# Patient Record
Sex: MALE | URBAN - METROPOLITAN AREA
[De-identification: names, ages, dates, MRNs, and addresses within clinical notes are randomized per-mention and may not be internally consistent; named-entity substitution may affect disease eponyms.]

---

## 2021-04-01 PROBLEM — Z00.00 ENCOUNTER FOR PREVENTIVE HEALTH EXAMINATION: Status: ACTIVE | Noted: 2021-04-01

## 2021-04-03 ENCOUNTER — EMERGENCY (EMERGENCY)
Facility: HOSPITAL | Age: 26
LOS: 1 days | Discharge: HOME | End: 2021-04-03
Attending: EMERGENCY MEDICINE
Payer: OTHER GOVERNMENT

## 2021-04-03 VITALS
OXYGEN SATURATION: 100 % | HEART RATE: 72 BPM | SYSTOLIC BLOOD PRESSURE: 118 MMHG | WEIGHT: 240.08 LBS | DIASTOLIC BLOOD PRESSURE: 65 MMHG | RESPIRATION RATE: 18 BRPM | HEIGHT: 78 IN

## 2021-04-03 PROCEDURE — 96374 THER/PROPH/DIAG INJ IV PUSH: CPT

## 2021-04-03 PROCEDURE — 99285 EMERGENCY DEPT VISIT HI MDM: CPT | Mod: 25

## 2021-04-03 RX ORDER — ONDANSETRON 8 MG/1
4 TABLET, FILM COATED ORAL ONCE
Refills: 0 | Status: COMPLETED | OUTPATIENT
Start: 2021-04-03 | End: 2021-04-03

## 2021-04-03 RX ORDER — SODIUM CHLORIDE 9 MG/ML
1000 INJECTION INTRAMUSCULAR; INTRAVENOUS; SUBCUTANEOUS ONCE
Refills: 0 | Status: COMPLETED | OUTPATIENT
Start: 2021-04-03 | End: 2021-04-03

## 2021-04-03 RX ADMIN — SODIUM CHLORIDE 1000 MILLILITER(S): 9 INJECTION INTRAMUSCULAR; INTRAVENOUS; SUBCUTANEOUS at 22:46

## 2021-04-03 RX ADMIN — ONDANSETRON 4 MILLIGRAM(S): 8 TABLET, FILM COATED ORAL at 21:43

## 2021-04-03 NOTE — ED PROVIDER NOTE - OBJECTIVE STATEMENT
26y M with no significant PMHx BIBEMS from [facility] from [facility] intoxicated and combative. Pt admits that he drank 12 to 16 beers tonight. As per [facility], pt vomited, then went to shower, but collapsed in shower and vomited again. EMS was called, but deferred transportation to [facility] due to aggression and being combative. Pt broke his restraints while in the ambulance and threatened [facility] staff. Uncooperative with exam or with obtaining vital signs due to pt being agitated and physically combative. Pt denies falls, head injury, or other known injuries. Denies taking any meds on a daily basis. Denies drinking frequently. 26y M with no significant PMHx BIBEMS from [facility] from [facility] intoxicated and combative. Pt admits that he drank 12 to 16 beers tonight. As per  at bedside, pt vomited, then went to shower, but collapsed in shower and vomited again. EMS was called, but deferred transportation to [facility] due to aggression and being combative. Pt broke his restraints while in the ambulance and threatened [facility] staff. Uncooperative with exam or with obtaining vital signs due to pt being agitated and physically combative. Pt denies falls, head injury, or other known injuries. Denies taking any meds on a daily basis. Denies drinking frequently.

## 2021-04-03 NOTE — ED PROVIDER NOTE - CONSTITUTIONAL, MLM
Awake, combative, uncooperative with examination or vitals. normal... Awake, intoxicated, combative, slurred speech.

## 2021-04-03 NOTE — ED PROVIDER NOTE - NSFOLLOWUPINSTRUCTIONS_ED_ALL_ED_FT
1. You were seen in the Emergency Room for alcohol intoxication  2. Please follow up with your doctor in 24-48 hours.  3. Return to the emergency room or seek immediate assistance for any new or concerning symptoms (such as fevers, chills, chest pain, shortness of breath, abdominal pain, nausea or vomiting ), or if you get worse.   4. Copies of your tests were provided to you for follow-up.  You must address all your findings with your doctor.

## 2021-04-03 NOTE — ED PROVIDER NOTE - CLINICAL SUMMARY MEDICAL DECISION MAKING FREE TEXT BOX
27 yo M BIBEMS from [facility] from [place], alcohol intoxication and combative. no evidence of trauma. will give zofran for nausea and IVF and place on 1:1 given combativeness in ER. Will monitor for sobriety and liekly d/c home

## 2021-04-03 NOTE — ED PROVIDER NOTE - PSYCHIATRIC, MLM
Awake, combative, uncooperative with examination or vitals. Awake, intoxicated, combative, slurred speech. MACHADO x4 spontaneously.

## 2021-04-03 NOTE — ED ADULT NURSE NOTE - OBJECTIVE STATEMENT
25 yo m arrived to the ed by ems with friend c/o intox; pt refusing to cooperate/refusing to give his name and any information r/t his hx; pt placed on a 1:1 r/t attempting to leave the ER; safety maintained, 1:1 @ bedside, pt currently sleeping and cooperative; zofran given emergently r/t vomiting; md @ bedside; vitals stable

## 2021-04-03 NOTE — ED PROVIDER NOTE - PROGRESS NOTE DETAILS
Attending Faiza Castaneda: pt currently sleeping. no evidence of trauma. will re need sober re-eval Filomena Lino MD PGY-3  patient still sleeping but arousable to verbal stimuli. states he drank 12 pack of beer today, not a daily drinker. once patient is more arousable, will PO challenge Filomena Lino MD PGY-3 patient now awake, ambulating with steady gait, tolerating PO. [facility] here to pick patient up and bring back. will d/c with return precautions

## 2021-04-03 NOTE — ED PROVIDER NOTE - PATIENT PORTAL LINK FT
You can access the FollowMyHealth Patient Portal offered by [facility] [place] by registering at the following website: [email or web address]. By joining [employer]’s FollowMyHealth portal, you will also be able to view your health information using other applications (apps) compatible with our system.

## 2021-04-03 NOTE — ED PROVIDER NOTE - EYES, MLM
Clear bilaterally, pupils equal, round and reactive to light. EOMI, PERRL, no conjunctival pallor, no scleral icterus.

## 2021-04-03 NOTE — ED PROVIDER NOTE - UNABLE TO OBTAIN
Pt is intoxicated and being agitated, aggressive, and physically combative. Urgent need for Intervention

## 2021-04-03 NOTE — ED PROVIDER NOTE - ATTENDING CONTRIBUTION TO CARE
Attending MD Marrero:  I personally have seen and examined this patient.  Resident note reviewed and agree on plan of care and except where noted.  See HPI, PE, and MDM for details.      Pt presenting with alcohol intoxication, no other ingestions. No trauma. Airway patent. Patient transferred to [facility] and care transitioned to Dr. Castaneda

## 2021-04-03 NOTE — ED PROVIDER NOTE - ENMT, MLM
Airway patent, NCAT. Airway patent, NCAT. No visible ecchymosis, no abrasions, no lacerations/tears. MMM, no discharge.

## 2021-04-03 NOTE — ED ADULT NURSE NOTE - NSIMPLEMENTINTERV_GEN_ALL_ED
Implemented All Fall Risk Interventions:  [place] to call system. Call bell, personal items and telephone within reach. Instruct patient to call for assistance. Room bathroom lighting operational. Non-slip footwear when patient is off stretcher. Physically safe environment: no spills, clutter or unnecessary equipment. Stretcher in lowest position, wheels locked, appropriate side rails in place. Provide visual cue, wrist band, yellow gown, etc. Monitor gait and stability. Monitor for mental status changes and reorient to person, place, and time. Review medications for side effects contributing to fall risk. Reinforce activity limits and safety measures with patient and family.

## 2021-04-04 VITALS
HEART RATE: 71 BPM | OXYGEN SATURATION: 97 % | DIASTOLIC BLOOD PRESSURE: 68 MMHG | SYSTOLIC BLOOD PRESSURE: 126 MMHG | TEMPERATURE: 98 F | RESPIRATION RATE: 20 BRPM

## 2021-04-06 ENCOUNTER — NON-APPOINTMENT (OUTPATIENT)
Age: 26
End: 2021-04-06

## 2021-04-06 ENCOUNTER — APPOINTMENT (OUTPATIENT)
Dept: CARDIOLOGY | Facility: CLINIC | Age: 26
End: 2021-04-06
Payer: OTHER GOVERNMENT

## 2021-04-06 VITALS
DIASTOLIC BLOOD PRESSURE: 76 MMHG | TEMPERATURE: 98.6 F | BODY MASS INDEX: 30.55 KG/M2 | WEIGHT: 264 LBS | SYSTOLIC BLOOD PRESSURE: 128 MMHG | HEART RATE: 67 BPM | OXYGEN SATURATION: 98 % | HEIGHT: 78 IN

## 2021-04-06 DIAGNOSIS — F17.200 NICOTINE DEPENDENCE, UNSPECIFIED, UNCOMPLICATED: ICD-10-CM

## 2021-04-06 DIAGNOSIS — Z78.9 OTHER SPECIFIED HEALTH STATUS: ICD-10-CM

## 2021-04-06 DIAGNOSIS — U07.1 COVID-19: ICD-10-CM

## 2021-04-06 NOTE — REVIEW OF SYSTEMS
[Headache] : no headache [Recent Weight Gain (___ Lbs)] : no recent weight gain [Feeling Fatigued] : not feeling fatigued [Recent Weight Loss (___ Lbs)] : no recent weight loss [Shortness Of Breath] : no shortness of breath [Dyspnea on exertion] : not dyspnea during exertion [Chest  Pressure] : no chest pressure [Chest Pain] : no chest pain [Lower Ext Edema] : no extremity edema [Leg Claudication] : no intermittent leg claudication [Palpitations] : no palpitations [see HPI] : see HPI [Cough] : no cough [Wheezing] : no wheezing [Coughing Up Blood] : no hemoptysis [Dizziness] : no dizziness [Negative] : Heme/Lymph

## 2021-04-06 NOTE — HISTORY OF PRESENT ILLNESS
[FreeTextEntry1] : April 6, 2021.  Patient is healthy active exercises without limitation.  Never told of a heart murmur or any cardiac issues.  No real past medical history of any significance.  No family history of coronary artery disease, cardiomyopathy, murmurs arrhythmias syncope.  Around March 2 patient had sore throat and a cough that persisted for breath 3 days.  He tested positive for COVID-19.  He has been asymptomatic since then including exercise sports etc.

## 2021-04-06 NOTE — PHYSICAL EXAM
[General Appearance - Well Developed] : well developed [Normal Appearance] : normal appearance [Well Groomed] : well groomed [General Appearance - Well Nourished] : well nourished [No Deformities] : no deformities [General Appearance - In No Acute Distress] : no acute distress [Normal Conjunctiva] : the conjunctiva exhibited no abnormalities [Eyelids - No Xanthelasma] : the eyelids demonstrated no xanthelasmas [Normal Oral Mucosa] : normal oral mucosa [No Oral Pallor] : no oral pallor [No Oral Cyanosis] : no oral cyanosis [Normal Jugular Venous A Waves Present] : normal jugular venous A waves present [Normal Jugular Venous V Waves Present] : normal jugular venous V waves present [No Jugular Venous Medley A Waves] : no jugular venous medley A waves [Heart Rate And Rhythm] : heart rate and rhythm were normal [Heart Sounds] : normal S1 and S2 [Murmurs] : no murmurs present [Respiration, Rhythm And Depth] : normal respiratory rhythm and effort [Exaggerated Use Of Accessory Muscles For Inspiration] : no accessory muscle use [Auscultation Breath Sounds / Voice Sounds] : lungs were clear to auscultation bilaterally [Abdomen Soft] : soft [Abdomen Tenderness] : non-tender [Abdomen Mass (___ Cm)] : no abdominal mass palpated [Abnormal Walk] : normal gait [Gait - Sufficient For Exercise Testing] : the gait was sufficient for exercise testing [Nail Clubbing] : no clubbing of the fingernails [Cyanosis, Localized] : no localized cyanosis [Petechial Hemorrhages (___cm)] : no petechial hemorrhages [FreeTextEntry1] : No edema.  Pulses 2+ bilaterally symmetric including pedal [Skin Color & Pigmentation] : normal skin color and pigmentation [] : no rash [No Venous Stasis] : no venous stasis [Skin Lesions] : no skin lesions [No Skin Ulcers] : no skin ulcer [No Xanthoma] : no  xanthoma was observed [Oriented To Time, Place, And Person] : oriented to person, place, and time [Affect] : the affect was normal [Mood] : the mood was normal [No Anxiety] : not feeling anxious

## 2021-04-06 NOTE — REASON FOR VISIT
[FreeTextEntry1] : 25-year-old from the [facility] [employer] who had a very mild Covid illness at the beginning of March

## 2021-04-15 ENCOUNTER — OUTPATIENT (OUTPATIENT)
Dept: OUTPATIENT SERVICES | Facility: HOSPITAL | Age: 26
LOS: 1 days | Discharge: HOME | End: 2021-04-15

## 2021-04-16 DIAGNOSIS — F10.10 ALCOHOL ABUSE, UNCOMPLICATED: ICD-10-CM

## 2023-06-24 NOTE — ED ADULT TRIAGE NOTE - SOURCE OF INFORMATION
Verbal/written post procedure instructions were given to patient/caregiver./Instructed patient/caregiver to follow-up with primary care physician./Instructed patient/caregiver regarding signs and symptoms of infection./Elevate the injured extremity as instructed./Keep the cast/splint/dressing clean and dry. Patient